# Patient Record
Sex: MALE | Race: ASIAN | NOT HISPANIC OR LATINO | URBAN - METROPOLITAN AREA
[De-identification: names, ages, dates, MRNs, and addresses within clinical notes are randomized per-mention and may not be internally consistent; named-entity substitution may affect disease eponyms.]

---

## 2017-08-12 ENCOUNTER — INPATIENT (INPATIENT)
Facility: HOSPITAL | Age: 57
LOS: 1 days | Discharge: HOME | End: 2017-08-14
Attending: INTERNAL MEDICINE | Admitting: INTERNAL MEDICINE
Payer: MEDICAID

## 2017-08-12 VITALS
RESPIRATION RATE: 16 BRPM | OXYGEN SATURATION: 96 % | SYSTOLIC BLOOD PRESSURE: 166 MMHG | TEMPERATURE: 99 F | HEART RATE: 64 BPM | DIASTOLIC BLOOD PRESSURE: 94 MMHG

## 2017-08-12 DIAGNOSIS — K21.9 GASTRO-ESOPHAGEAL REFLUX DISEASE WITHOUT ESOPHAGITIS: ICD-10-CM

## 2017-08-12 DIAGNOSIS — R07.9 CHEST PAIN, UNSPECIFIED: ICD-10-CM

## 2017-08-12 DIAGNOSIS — Z95.2 PRESENCE OF PROSTHETIC HEART VALVE: Chronic | ICD-10-CM

## 2017-08-12 LAB
ALBUMIN SERPL ELPH-MCNC: 4 G/DL — SIGNIFICANT CHANGE UP (ref 3.3–5)
ALP SERPL-CCNC: 79 U/L — SIGNIFICANT CHANGE UP (ref 40–120)
ALT FLD-CCNC: 18 U/L — SIGNIFICANT CHANGE UP (ref 4–41)
APPEARANCE UR: CLEAR — SIGNIFICANT CHANGE UP
APTT BLD: 39.9 SEC — HIGH (ref 27.5–37.4)
APTT BLD: 92.8 SEC — HIGH (ref 27.5–37.4)
AST SERPL-CCNC: 28 U/L — SIGNIFICANT CHANGE UP (ref 4–40)
BASOPHILS # BLD AUTO: 0.04 K/UL — SIGNIFICANT CHANGE UP (ref 0–0.2)
BASOPHILS NFR BLD AUTO: 0.8 % — SIGNIFICANT CHANGE UP (ref 0–2)
BILIRUB SERPL-MCNC: 0.3 MG/DL — SIGNIFICANT CHANGE UP (ref 0.2–1.2)
BILIRUB UR-MCNC: NEGATIVE — SIGNIFICANT CHANGE UP
BLOOD UR QL VISUAL: HIGH
BUN SERPL-MCNC: 7 MG/DL — SIGNIFICANT CHANGE UP (ref 7–23)
CALCIUM SERPL-MCNC: 8.9 MG/DL — SIGNIFICANT CHANGE UP (ref 8.4–10.5)
CHLORIDE SERPL-SCNC: 99 MMOL/L — SIGNIFICANT CHANGE UP (ref 98–107)
CK MB BLD-MCNC: 1.26 NG/ML — SIGNIFICANT CHANGE UP (ref 1–6.6)
CK MB BLD-MCNC: 1.33 NG/ML — SIGNIFICANT CHANGE UP (ref 1–6.6)
CK MB BLD-MCNC: SIGNIFICANT CHANGE UP (ref 0–2.5)
CK SERPL-CCNC: 91 U/L — SIGNIFICANT CHANGE UP (ref 30–200)
CK SERPL-CCNC: 95 U/L — SIGNIFICANT CHANGE UP (ref 30–200)
CO2 SERPL-SCNC: 24 MMOL/L — SIGNIFICANT CHANGE UP (ref 22–31)
COLOR SPEC: SIGNIFICANT CHANGE UP
CREAT SERPL-MCNC: 0.85 MG/DL — SIGNIFICANT CHANGE UP (ref 0.5–1.3)
DIGOXIN SERPL-MCNC: < 0.3 NG/ML — LOW (ref 0.8–2)
EOSINOPHIL # BLD AUTO: 0.14 K/UL — SIGNIFICANT CHANGE UP (ref 0–0.5)
EOSINOPHIL NFR BLD AUTO: 2.6 % — SIGNIFICANT CHANGE UP (ref 0–6)
GLUCOSE SERPL-MCNC: 132 MG/DL — HIGH (ref 70–99)
GLUCOSE UR-MCNC: NEGATIVE — SIGNIFICANT CHANGE UP
HBA1C BLD-MCNC: 6.3 % — HIGH (ref 4–5.6)
HCT VFR BLD CALC: 35 % — LOW (ref 39–50)
HCT VFR BLD CALC: 37.5 % — LOW (ref 39–50)
HGB BLD-MCNC: 11.5 G/DL — LOW (ref 13–17)
HGB BLD-MCNC: 11.9 G/DL — LOW (ref 13–17)
IMM GRANULOCYTES # BLD AUTO: 0.02 # — SIGNIFICANT CHANGE UP
IMM GRANULOCYTES NFR BLD AUTO: 0.4 % — SIGNIFICANT CHANGE UP (ref 0–1.5)
INR BLD: 2.31 — HIGH (ref 0.88–1.17)
KETONES UR-MCNC: NEGATIVE — SIGNIFICANT CHANGE UP
LEUKOCYTE ESTERASE UR-ACNC: NEGATIVE — SIGNIFICANT CHANGE UP
LYMPHOCYTES # BLD AUTO: 1.49 K/UL — SIGNIFICANT CHANGE UP (ref 1–3.3)
LYMPHOCYTES # BLD AUTO: 28.1 % — SIGNIFICANT CHANGE UP (ref 13–44)
MAGNESIUM SERPL-MCNC: 1.9 MG/DL — SIGNIFICANT CHANGE UP (ref 1.6–2.6)
MCHC RBC-ENTMCNC: 28.7 PG — SIGNIFICANT CHANGE UP (ref 27–34)
MCHC RBC-ENTMCNC: 29.6 PG — SIGNIFICANT CHANGE UP (ref 27–34)
MCHC RBC-ENTMCNC: 31.7 % — LOW (ref 32–36)
MCHC RBC-ENTMCNC: 32.9 % — SIGNIFICANT CHANGE UP (ref 32–36)
MCV RBC AUTO: 90.2 FL — SIGNIFICANT CHANGE UP (ref 80–100)
MCV RBC AUTO: 90.4 FL — SIGNIFICANT CHANGE UP (ref 80–100)
MONOCYTES # BLD AUTO: 0.56 K/UL — SIGNIFICANT CHANGE UP (ref 0–0.9)
MONOCYTES NFR BLD AUTO: 10.6 % — SIGNIFICANT CHANGE UP (ref 2–14)
NEUTROPHILS # BLD AUTO: 3.05 K/UL — SIGNIFICANT CHANGE UP (ref 1.8–7.4)
NEUTROPHILS NFR BLD AUTO: 57.5 % — SIGNIFICANT CHANGE UP (ref 43–77)
NITRITE UR-MCNC: NEGATIVE — SIGNIFICANT CHANGE UP
NRBC # FLD: 0 — SIGNIFICANT CHANGE UP
NRBC # FLD: 0 — SIGNIFICANT CHANGE UP
OSMOLALITY SERPL: 285 MOSMO/KG — SIGNIFICANT CHANGE UP (ref 275–295)
OSMOLALITY UR: 267 MOSMO/KG — SIGNIFICANT CHANGE UP (ref 50–1200)
PH UR: 6.5 — SIGNIFICANT CHANGE UP (ref 4.6–8)
PLATELET # BLD AUTO: 210 K/UL — SIGNIFICANT CHANGE UP (ref 150–400)
PLATELET # BLD AUTO: 230 K/UL — SIGNIFICANT CHANGE UP (ref 150–400)
PMV BLD: 9.6 FL — SIGNIFICANT CHANGE UP (ref 7–13)
PMV BLD: 9.9 FL — SIGNIFICANT CHANGE UP (ref 7–13)
POTASSIUM SERPL-MCNC: 3.8 MMOL/L — SIGNIFICANT CHANGE UP (ref 3.5–5.3)
POTASSIUM SERPL-SCNC: 3.8 MMOL/L — SIGNIFICANT CHANGE UP (ref 3.5–5.3)
PROT SERPL-MCNC: 7.6 G/DL — SIGNIFICANT CHANGE UP (ref 6–8.3)
PROT UR-MCNC: NEGATIVE — SIGNIFICANT CHANGE UP
PROTHROM AB SERPL-ACNC: 26.3 SEC — HIGH (ref 9.8–13.1)
RBC # BLD: 3.88 M/UL — LOW (ref 4.2–5.8)
RBC # BLD: 4.15 M/UL — LOW (ref 4.2–5.8)
RBC # FLD: 14.6 % — HIGH (ref 10.3–14.5)
RBC # FLD: 14.8 % — HIGH (ref 10.3–14.5)
RBC CASTS # UR COMP ASSIST: SIGNIFICANT CHANGE UP (ref 0–?)
SODIUM SERPL-SCNC: 134 MMOL/L — LOW (ref 135–145)
SP GR SPEC: 1.01 — SIGNIFICANT CHANGE UP (ref 1–1.03)
TROPONIN T SERPL-MCNC: < 0.06 NG/ML — SIGNIFICANT CHANGE UP (ref 0–0.06)
TROPONIN T SERPL-MCNC: < 0.06 NG/ML — SIGNIFICANT CHANGE UP (ref 0–0.06)
TSH SERPL-MCNC: 1.58 UIU/ML — SIGNIFICANT CHANGE UP (ref 0.27–4.2)
UROBILINOGEN FLD QL: NORMAL E.U. — SIGNIFICANT CHANGE UP (ref 0.1–0.2)
WBC # BLD: 5.3 K/UL — SIGNIFICANT CHANGE UP (ref 3.8–10.5)
WBC # BLD: 6.63 K/UL — SIGNIFICANT CHANGE UP (ref 3.8–10.5)
WBC # FLD AUTO: 5.3 K/UL — SIGNIFICANT CHANGE UP (ref 3.8–10.5)
WBC # FLD AUTO: 6.63 K/UL — SIGNIFICANT CHANGE UP (ref 3.8–10.5)

## 2017-08-12 PROCEDURE — 71020: CPT | Mod: 26

## 2017-08-12 RX ORDER — HEPARIN SODIUM 5000 [USP'U]/ML
2500 INJECTION INTRAVENOUS; SUBCUTANEOUS EVERY 6 HOURS
Qty: 0 | Refills: 0 | Status: DISCONTINUED | OUTPATIENT
Start: 2017-08-12 | End: 2017-08-14

## 2017-08-12 RX ORDER — PANTOPRAZOLE SODIUM 20 MG/1
40 TABLET, DELAYED RELEASE ORAL
Qty: 0 | Refills: 0 | Status: DISCONTINUED | OUTPATIENT
Start: 2017-08-12 | End: 2017-08-14

## 2017-08-12 RX ORDER — METOPROLOL TARTRATE 50 MG
12.5 TABLET ORAL
Qty: 0 | Refills: 0 | Status: DISCONTINUED | OUTPATIENT
Start: 2017-08-12 | End: 2017-08-14

## 2017-08-12 RX ORDER — HEPARIN SODIUM 5000 [USP'U]/ML
5000 INJECTION INTRAVENOUS; SUBCUTANEOUS EVERY 6 HOURS
Qty: 0 | Refills: 0 | Status: DISCONTINUED | OUTPATIENT
Start: 2017-08-12 | End: 2017-08-14

## 2017-08-12 RX ORDER — ACETAMINOPHEN 500 MG
650 TABLET ORAL EVERY 6 HOURS
Qty: 0 | Refills: 0 | Status: DISCONTINUED | OUTPATIENT
Start: 2017-08-12 | End: 2017-08-14

## 2017-08-12 RX ORDER — HEPARIN SODIUM 5000 [USP'U]/ML
INJECTION INTRAVENOUS; SUBCUTANEOUS
Qty: 25000 | Refills: 0 | Status: DISCONTINUED | OUTPATIENT
Start: 2017-08-12 | End: 2017-08-13

## 2017-08-12 RX ORDER — ASPIRIN/CALCIUM CARB/MAGNESIUM 324 MG
81 TABLET ORAL DAILY
Qty: 0 | Refills: 0 | Status: DISCONTINUED | OUTPATIENT
Start: 2017-08-12 | End: 2017-08-14

## 2017-08-12 RX ORDER — ASPIRIN/CALCIUM CARB/MAGNESIUM 324 MG
162 TABLET ORAL ONCE
Qty: 0 | Refills: 0 | Status: COMPLETED | OUTPATIENT
Start: 2017-08-12 | End: 2017-08-12

## 2017-08-12 RX ORDER — ATORVASTATIN CALCIUM 80 MG/1
80 TABLET, FILM COATED ORAL AT BEDTIME
Qty: 0 | Refills: 0 | Status: DISCONTINUED | OUTPATIENT
Start: 2017-08-12 | End: 2017-08-14

## 2017-08-12 RX ADMIN — HEPARIN SODIUM 1100 UNIT(S)/HR: 5000 INJECTION INTRAVENOUS; SUBCUTANEOUS at 16:45

## 2017-08-12 RX ADMIN — Medication 162 MILLIGRAM(S): at 13:13

## 2017-08-12 RX ADMIN — Medication 12.5 MILLIGRAM(S): at 17:01

## 2017-08-12 RX ADMIN — ATORVASTATIN CALCIUM 80 MILLIGRAM(S): 80 TABLET, FILM COATED ORAL at 22:15

## 2017-08-12 NOTE — H&P ADULT - NSHPPHYSICALEXAM_GEN_ALL_CORE
Vital Signs Last 24 Hrs  T(C): 36.7 (12 Aug 2017 16:28), Max: 37.1 (12 Aug 2017 10:48)  T(F): 98 (12 Aug 2017 16:28), Max: 98.7 (12 Aug 2017 10:48)  HR: 58 (12 Aug 2017 16:28) (58 - 64)  BP: 139/76 (12 Aug 2017 16:28) (139/76 - 166/94)  BP(mean): --  RR: 18 (12 Aug 2017 16:28) (15 - 18)  SpO2: 100% (12 Aug 2017 16:28) (96% - 100%)    EKG: NSR @ 64, T inv III, AVF, V6, ST inc V1-3, ST dep V6

## 2017-08-12 NOTE — H&P ADULT - ATTENDING COMMENTS
Patient seen and examined, agree with the above assessment and plan by PA with the following exceptions.  Pt is a 57 year old male with PMH of mechanical AVR >20 yrs ago presenting with an episode of nonexertional chest pain with mild dipahoresis yesterday while watching TV.  Pt appears comfortable, denies any recurrence of his symptoms.  Vitals are stable  Exam reveals harsh systolic murmur w mechanical click and crackles in the bases  labs are unremarkable, troponins are negative x 2, INR 2.4  ECG reveals NSR w LVH and repol abnl  no evidence of ACS    Plan:  -ECHO to evaluate AVR function  -low dose lasix  -asa  -heparin gtt pending echo/stress results  -P

## 2017-08-12 NOTE — ED PROVIDER NOTE - ATTENDING CONTRIBUTION TO CARE
ED Attending Dr. Gordon: 58 yo male with PMH aortic valve replacement 20+ years ago, on warfarine, HTN (based on medication), in ED with 1 day of substernal chest pain that radiates into left shoulder.  Pain is nonexertional and NOT associated with SOB, N/V/D or abdominal pain.  On exam pt well appearing, in NAD, heart RRR, lungs CTAB, abd NTND, extremities without swelling, strength 5/5 in all extremities and skin without rash.  [place] came [place] from [facility] 1 month ago.

## 2017-08-12 NOTE — H&P ADULT - HISTORY OF PRESENT ILLNESS
58 y/o Belarusian speaking male (Family at bedside translated), with a PmHx of Mechanical AVR (20yrs ago) on coumadin, p/w chest pain. [place] [facility]. Pt states he was sitting on the couch last night around 2200hrs watching television when he had a sudden onset of a left side, non-radiating, 7/10, pressure like sensation in his chest. Pt states he had some associated symptoms of diaphoresis but denies any fever, chills, sob, HA, dizziness, blurred vision, n/v. Pt's family states he normally doesn't ambulate much in the house because he feels lethargic and weak all the time. Currently, the chest pain has been alleviated but the pain had lasted all night and this morning. He currently doesn't not have any physicians in the [place] and has been here about 1 month and is now planning on staying here. He is being admitted to telemetry for r/o acs.

## 2017-08-12 NOTE — ED SUB INTERN NOTE - MEDICAL DECISION MAKING DETAILS
56 yo M PSH aortic valve replacement 23 years ago on warfarin p/w 1 day of chest pain. Vital signs within normal limits, pt comfortable, exam unremarkable other than murmur from prosthetic valve. It is necessary to r/o ACS. Order CBC, CMP, cardiac enzymes, repeat EKG, CXR. Likely admit to telemetry unit. Admin ASA

## 2017-08-12 NOTE — ED SUB INTERN NOTE - OBJECTIVE STATEMENT FT
58 yo M PSH aortic valve replacement x23 years ago, on warfarin, p/w chest pain since last night. The pt was resting at home when he started to feel a pain in the left side of his chest, only described as "medium". He states it has radiated to his left shoulder and the left side of his neck. He says he has had a similar pain before, but never this bad- he has not sought out medical attention for this as he just [place] the [place] from [facility] (1 month ago). He otherwise feels well; denies HA, lightheadedness, nausea/vomiting, abdominal pain, weakness, syncope or SOB. He denies any other medical problems including HTN and DM. His medical records from [facility] are mostly in another language, but state that he has been on bisoprolol, losartan/HCTZ, penicillin V, and warfarin.  [facility]  used #[id number] as well as patient's brother. 56 yo M PSH aortic valve replacement x23 years ago, on warfarin, p/w chest pain since last night. The pt was resting at home when he started to feel a pain in the left side of his chest, only described as "medium". He states it has radiated to his left shoulder and the left side of his neck. He says he has had a similar pain before, but never this bad- he has not sought out medical attention for this as he just came [place] from [facility] (1 month ago). He otherwise feels well; denies HA, lightheadedness, nausea/vomiting, abdominal pain, weakness, syncope or SOB. He denies any other medical problems including HTN and DM. His medical records from [facility] are mostly in another language, but state that he has been on bisoprolol, losartan/HCTZ, penicillin V, and warfarin.  [facility]  used #[id number] as well as patient's brother

## 2017-08-12 NOTE — H&P ADULT - RS GEN PE MLT RESP DETAILS PC
respirations non-labored/good air movement/clear to auscultation bilaterally/no chest wall tenderness/breath sounds equal/airway patent

## 2017-08-12 NOTE — ED SUB INTERN NOTE - MUSCULOSKELETAL, MLM
Chest is slightly tender to palpation on left side. Spine appears normal, range of motion is not limited, no muscle or joint tenderness

## 2017-08-12 NOTE — ED ADULT NURSE NOTE - CHIEF COMPLAINT QUOTE
brother states" he has chest pain since last night" h/o  mechanical valve in the heart with unknown name of blood thinners. patient recently came from [facility]

## 2017-08-12 NOTE — H&P ADULT - NEGATIVE OPHTHALMOLOGIC SYMPTOMS
no loss of vision R/no loss of vision L/no blurred vision L/no blurred vision R/no diplopia/no photophobia

## 2017-08-12 NOTE — ED ADULT NURSE REASSESSMENT NOTE - NS ED NURSE REASSESS COMMENT FT1
received report on pt from ZULAY Hodge. pt presents non-english speaking with family at bedside for translation. a&ox4, denies dizziness or ha. skin warm, dry, appropriate for race. respirations even unlabored, endorses minimal chest pain at this time, improved from arrival. abdomen soft nontender nondistended. denies n/v/d denies fever or chills. ivl site clean, dry, intact, patent. cardiac monitor in place in nsr. vs as noted. family at bedside. medicated with ASA 162mg po as ordered. safety maintained. will continue to observe.

## 2017-08-12 NOTE — H&P ADULT - ASSESSMENT
56 y/o Lao speaking male (Family at bedside translated), with a PmHx of Mechanical AVR (20yrs ago) on coumadin, is being admitted to telemetry for chest pain r/o acs.

## 2017-08-12 NOTE — ED SUB INTERN NOTE - CONSTITUTIONAL, MLM
normal... Well nourished older gentleman, awake, alert, oriented to person, place, and in no apparent distress.

## 2017-08-12 NOTE — ED PROVIDER NOTE - OBJECTIVE STATEMENT
57M PMH aortic valve replacement approx 20 years ago (on coumadin), recently came [place] from [facility] 1 month ago p/w 1 day of substernal chest pressure, possible radiation to L shoulder, nonexertional, described as "medium pain." Denies SOB, lightheadedness, abd pain, NV, fever/chills/weakness, or syncope. Has had similar pain in past but is unable to put pain in context of any interventions or testing that may have been done at that time. +pain at present.     Hx obtained via  phone Jaron  ID #[id number]

## 2017-08-12 NOTE — ED ADULT TRIAGE NOTE - CHIEF COMPLAINT QUOTE
brother states" he has chest pain since last night" h/o  mechanical valve in the heart with unknown name of blood thinners. patient recently came from [facility]

## 2017-08-12 NOTE — H&P ADULT - NSHPSOCIALHISTORY_GEN_ALL_CORE
Marital Status:     Tobacco Use: neg    ETOH Use: neg    Flu Vaccine:      neg                            Pneumonia Vaccine: neg

## 2017-08-12 NOTE — H&P ADULT - PROBLEM SELECTOR PLAN 1
ekg/telemetry, f/u ce x 2, mg, tsh, echo, asa, plan is for an angiogram on Monday, hold coumadin for now for cath on Monday, will start on Heparin gtt because pt has a mechanical AVR, monitor inr & PTT

## 2017-08-12 NOTE — ED ADULT NURSE NOTE - OBJECTIVE STATEMENT
Patient received in RM 1 A&Ox3 and ambulatory at baseline. Primarily Malay speaking- translation received from patients brother as per request. Patient c/o left sided chest pain at rest since last night. Reports chronic chest pain but pain was at its worst last night. Denies SOB. Patient recently arrived from [facility]  and does not have a primary care provider in the [place] Reports a history of mechanical valve. PE and history as noted below.

## 2017-08-12 NOTE — H&P ADULT - FAMILY HISTORY
Father  Still living? No  Family history of MI (myocardial infarction), Age at diagnosis: Age Unknown  Family history of diabetes mellitus, Age at diagnosis: Age Unknown     Mother  Still living? No  Family history of MI (myocardial infarction), Age at diagnosis: Age Unknown  Family history of diabetes mellitus, Age at diagnosis: Age Unknown     Sibling  Still living? No  Family history of MI (myocardial infarction), Age at diagnosis: Age Unknown

## 2017-08-13 LAB
APTT BLD: 136.3 SEC — CRITICAL HIGH (ref 27.5–37.4)
APTT BLD: 71.4 SEC — HIGH (ref 27.5–37.4)
BUN SERPL-MCNC: 11 MG/DL — SIGNIFICANT CHANGE UP (ref 7–23)
CALCIUM SERPL-MCNC: 9.2 MG/DL — SIGNIFICANT CHANGE UP (ref 8.4–10.5)
CHLORIDE SERPL-SCNC: 101 MMOL/L — SIGNIFICANT CHANGE UP (ref 98–107)
CHOLEST SERPL-MCNC: 182 MG/DL — SIGNIFICANT CHANGE UP (ref 120–199)
CO2 SERPL-SCNC: 26 MMOL/L — SIGNIFICANT CHANGE UP (ref 22–31)
CREAT SERPL-MCNC: 0.88 MG/DL — SIGNIFICANT CHANGE UP (ref 0.5–1.3)
GLUCOSE SERPL-MCNC: 110 MG/DL — HIGH (ref 70–99)
HCT VFR BLD CALC: 36.4 % — LOW (ref 39–50)
HCT VFR BLD CALC: 37.9 % — LOW (ref 39–50)
HDLC SERPL-MCNC: 30 MG/DL — LOW (ref 35–55)
HGB BLD-MCNC: 11.7 G/DL — LOW (ref 13–17)
HGB BLD-MCNC: 12.2 G/DL — LOW (ref 13–17)
INR BLD: 2.3 — HIGH (ref 0.88–1.17)
INR BLD: 2.48 — HIGH (ref 0.88–1.17)
LIPID PNL WITH DIRECT LDL SERPL: 139 MG/DL — SIGNIFICANT CHANGE UP
MCHC RBC-ENTMCNC: 28.8 PG — SIGNIFICANT CHANGE UP (ref 27–34)
MCHC RBC-ENTMCNC: 29.3 PG — SIGNIFICANT CHANGE UP (ref 27–34)
MCHC RBC-ENTMCNC: 32.1 % — SIGNIFICANT CHANGE UP (ref 32–36)
MCHC RBC-ENTMCNC: 32.2 % — SIGNIFICANT CHANGE UP (ref 32–36)
MCV RBC AUTO: 89.6 FL — SIGNIFICANT CHANGE UP (ref 80–100)
MCV RBC AUTO: 91.2 FL — SIGNIFICANT CHANGE UP (ref 80–100)
NRBC # FLD: 0 — SIGNIFICANT CHANGE UP
NRBC # FLD: 0 — SIGNIFICANT CHANGE UP
PLATELET # BLD AUTO: 219 K/UL — SIGNIFICANT CHANGE UP (ref 150–400)
PLATELET # BLD AUTO: 234 K/UL — SIGNIFICANT CHANGE UP (ref 150–400)
PMV BLD: 10 FL — SIGNIFICANT CHANGE UP (ref 7–13)
PMV BLD: 10.1 FL — SIGNIFICANT CHANGE UP (ref 7–13)
POTASSIUM SERPL-MCNC: 4.7 MMOL/L — SIGNIFICANT CHANGE UP (ref 3.5–5.3)
POTASSIUM SERPL-SCNC: 4.7 MMOL/L — SIGNIFICANT CHANGE UP (ref 3.5–5.3)
PROTHROM AB SERPL-ACNC: 26.2 SEC — HIGH (ref 9.8–13.1)
PROTHROM AB SERPL-ACNC: 28.3 SEC — HIGH (ref 9.8–13.1)
RBC # BLD: 3.99 M/UL — LOW (ref 4.2–5.8)
RBC # BLD: 4.23 M/UL — SIGNIFICANT CHANGE UP (ref 4.2–5.8)
RBC # FLD: 14.7 % — HIGH (ref 10.3–14.5)
RBC # FLD: 14.7 % — HIGH (ref 10.3–14.5)
SODIUM SERPL-SCNC: 139 MMOL/L — SIGNIFICANT CHANGE UP (ref 135–145)
TRIGL SERPL-MCNC: 148 MG/DL — SIGNIFICANT CHANGE UP (ref 10–149)
WBC # BLD: 5.92 K/UL — SIGNIFICANT CHANGE UP (ref 3.8–10.5)
WBC # BLD: 6.72 K/UL — SIGNIFICANT CHANGE UP (ref 3.8–10.5)
WBC # FLD AUTO: 5.92 K/UL — SIGNIFICANT CHANGE UP (ref 3.8–10.5)
WBC # FLD AUTO: 6.72 K/UL — SIGNIFICANT CHANGE UP (ref 3.8–10.5)

## 2017-08-13 RX ORDER — FUROSEMIDE 40 MG
20 TABLET ORAL DAILY
Qty: 0 | Refills: 0 | Status: DISCONTINUED | OUTPATIENT
Start: 2017-08-13 | End: 2017-08-14

## 2017-08-13 RX ORDER — HEPARIN SODIUM 5000 [USP'U]/ML
900 INJECTION INTRAVENOUS; SUBCUTANEOUS
Qty: 25000 | Refills: 0 | Status: DISCONTINUED | OUTPATIENT
Start: 2017-08-13 | End: 2017-08-14

## 2017-08-13 RX ADMIN — HEPARIN SODIUM 1100 UNIT(S)/HR: 5000 INJECTION INTRAVENOUS; SUBCUTANEOUS at 00:01

## 2017-08-13 RX ADMIN — HEPARIN SODIUM 900 UNIT(S)/HR: 5000 INJECTION INTRAVENOUS; SUBCUTANEOUS at 18:57

## 2017-08-13 RX ADMIN — HEPARIN SODIUM 900 UNIT(S)/HR: 5000 INJECTION INTRAVENOUS; SUBCUTANEOUS at 10:02

## 2017-08-13 RX ADMIN — PANTOPRAZOLE SODIUM 40 MILLIGRAM(S): 20 TABLET, DELAYED RELEASE ORAL at 06:31

## 2017-08-13 RX ADMIN — Medication 20 MILLIGRAM(S): at 12:39

## 2017-08-13 RX ADMIN — Medication 12.5 MILLIGRAM(S): at 17:18

## 2017-08-13 RX ADMIN — Medication 12.5 MILLIGRAM(S): at 06:31

## 2017-08-13 RX ADMIN — Medication 81 MILLIGRAM(S): at 12:39

## 2017-08-13 RX ADMIN — ATORVASTATIN CALCIUM 80 MILLIGRAM(S): 80 TABLET, FILM COATED ORAL at 21:51

## 2017-08-14 VITALS — WEIGHT: 138.89 LBS

## 2017-08-14 LAB
APTT BLD: 78.6 SEC — HIGH (ref 27.5–37.4)
BUN SERPL-MCNC: 10 MG/DL — SIGNIFICANT CHANGE UP (ref 7–23)
CALCIUM SERPL-MCNC: 8.9 MG/DL — SIGNIFICANT CHANGE UP (ref 8.4–10.5)
CHLORIDE SERPL-SCNC: 101 MMOL/L — SIGNIFICANT CHANGE UP (ref 98–107)
CO2 SERPL-SCNC: 25 MMOL/L — SIGNIFICANT CHANGE UP (ref 22–31)
CREAT SERPL-MCNC: 0.78 MG/DL — SIGNIFICANT CHANGE UP (ref 0.5–1.3)
GLUCOSE SERPL-MCNC: 105 MG/DL — HIGH (ref 70–99)
HCT VFR BLD CALC: 37.9 % — LOW (ref 39–50)
HGB BLD-MCNC: 12.1 G/DL — LOW (ref 13–17)
INR BLD: 2.01 — HIGH (ref 0.88–1.17)
MAGNESIUM SERPL-MCNC: 1.9 MG/DL — SIGNIFICANT CHANGE UP (ref 1.6–2.6)
MCHC RBC-ENTMCNC: 28.6 PG — SIGNIFICANT CHANGE UP (ref 27–34)
MCHC RBC-ENTMCNC: 31.9 % — LOW (ref 32–36)
MCV RBC AUTO: 89.6 FL — SIGNIFICANT CHANGE UP (ref 80–100)
NRBC # FLD: 0 — SIGNIFICANT CHANGE UP
PLATELET # BLD AUTO: 231 K/UL — SIGNIFICANT CHANGE UP (ref 150–400)
PMV BLD: 10 FL — SIGNIFICANT CHANGE UP (ref 7–13)
POTASSIUM SERPL-MCNC: 4 MMOL/L — SIGNIFICANT CHANGE UP (ref 3.5–5.3)
POTASSIUM SERPL-SCNC: 4 MMOL/L — SIGNIFICANT CHANGE UP (ref 3.5–5.3)
PROTHROM AB SERPL-ACNC: 22.8 SEC — HIGH (ref 9.8–13.1)
RBC # BLD: 4.23 M/UL — SIGNIFICANT CHANGE UP (ref 4.2–5.8)
RBC # FLD: 14.7 % — HIGH (ref 10.3–14.5)
SODIUM SERPL-SCNC: 138 MMOL/L — SIGNIFICANT CHANGE UP (ref 135–145)
WBC # BLD: 6.29 K/UL — SIGNIFICANT CHANGE UP (ref 3.8–10.5)
WBC # FLD AUTO: 6.29 K/UL — SIGNIFICANT CHANGE UP (ref 3.8–10.5)

## 2017-08-14 PROCEDURE — 78452 HT MUSCLE IMAGE SPECT MULT: CPT | Mod: 26

## 2017-08-14 PROCEDURE — 93016 CV STRESS TEST SUPVJ ONLY: CPT | Mod: GC

## 2017-08-14 PROCEDURE — 93018 CV STRESS TEST I&R ONLY: CPT | Mod: GC

## 2017-08-14 PROCEDURE — 93306 TTE W/DOPPLER COMPLETE: CPT | Mod: 26

## 2017-08-14 RX ORDER — ASPIRIN/CALCIUM CARB/MAGNESIUM 324 MG
1 TABLET ORAL
Qty: 30 | Refills: 0 | OUTPATIENT
Start: 2017-08-14 | End: 2017-09-13

## 2017-08-14 RX ORDER — METOPROLOL TARTRATE 50 MG
0.5 TABLET ORAL
Qty: 30 | Refills: 0 | OUTPATIENT
Start: 2017-08-14 | End: 2017-09-13

## 2017-08-14 RX ORDER — ATORVASTATIN CALCIUM 80 MG/1
1 TABLET, FILM COATED ORAL
Qty: 30 | Refills: 0 | OUTPATIENT
Start: 2017-08-14 | End: 2017-09-13

## 2017-08-14 RX ORDER — PANTOPRAZOLE SODIUM 20 MG/1
1 TABLET, DELAYED RELEASE ORAL
Qty: 0 | Refills: 0 | COMMUNITY
Start: 2017-08-14

## 2017-08-14 RX ORDER — FUROSEMIDE 40 MG
1 TABLET ORAL
Qty: 0 | Refills: 0 | COMMUNITY
Start: 2017-08-14

## 2017-08-14 RX ADMIN — PANTOPRAZOLE SODIUM 40 MILLIGRAM(S): 20 TABLET, DELAYED RELEASE ORAL at 05:57

## 2017-08-14 RX ADMIN — Medication 81 MILLIGRAM(S): at 12:49

## 2017-08-14 RX ADMIN — HEPARIN SODIUM 900 UNIT(S)/HR: 5000 INJECTION INTRAVENOUS; SUBCUTANEOUS at 01:25

## 2017-08-14 RX ADMIN — Medication 12.5 MILLIGRAM(S): at 17:12

## 2017-08-14 RX ADMIN — Medication 20 MILLIGRAM(S): at 05:57

## 2017-08-14 NOTE — DISCHARGE NOTE ADULT - MEDICATION SUMMARY - MEDICATIONS TO STOP TAKING
I will STOP taking the medications listed below when I get home from the hospital:    Bistol  -- 2.5 milligram(s) by mouth once a day    Pen VK  -- 250 milligram(s) by mouth 2 times a day    Arabella  -- 25 milligram(s) by mouth 2 times a day, As Needed

## 2017-08-14 NOTE — DISCHARGE NOTE ADULT - MEDICATION SUMMARY - MEDICATIONS TO TAKE
I will START or STAY ON the medications listed below when I get home from the hospital:    Warin  -- 5 milligram(s) by mouth once a day (at bedtime)  -- Indication: For S/P aortic valve replacement    aspirin 81 mg oral delayed release tablet  -- 1 tab(s) by mouth once a day  -- Indication: For S/P aortic valve replacement    enalapril 2.5 mg oral tablet  -- 1 tab(s) by mouth once a day  -- Do not take this drug if you are pregnant.  It is very important that you take or use this exactly as directed.  Do not skip doses or discontinue unless directed by your doctor.  Some non-prescription drugs may aggravate your condition.  Read all labels carefully.  If a warning appears, check with your doctor before taking.    -- Indication: For Cardiomyopathy    atorvastatin 80 mg oral tablet  -- 1 tab(s) by mouth once a day (at bedtime)  -- Indication: For LV dysfunction    Metoprolol Tartrate 25 mg oral tablet  -- 0.5 tab(s) by mouth 2 times a day  -- It is very important that you take or use this exactly as directed.  Do not skip doses or discontinue unless directed by your doctor.  May cause drowsiness.  Alcohol may intensify this effect.  Use care when operating dangerous machinery.  Some non-prescription drugs may aggravate your condition.  Read all labels carefully.  If a warning appears, check with your doctor before taking.  Take with food or milk.  This drug may impair the ability to drive or operate machinery.  Use care until you become familiar with its effects.    -- Indication: For LV dysfunction    furosemide 20 mg oral tablet  -- 1 tab(s) by mouth once a day  -- Indication: For LV dysfunction    pantoprazole 40 mg oral delayed release tablet  -- 1 tab(s) by mouth once a day (before a meal)  -- Indication: For Stomach

## 2017-08-14 NOTE — CHART NOTE - NSCHARTNOTEFT_GEN_A_CORE
results discussed with cardiology NP - patient stable for discharge home - will add ACE enalapril 2.5 mg   discussed with patients family ( niece ) will send scripts to [facility]   instructed cardiology clinic follow up

## 2017-08-14 NOTE — PROGRESS NOTE ADULT - SUBJECTIVE AND OBJECTIVE BOX
CARDIOLOGY FOLLOW UP - Dr. Tamayo    CC no chest pain or sob       PHYSICAL EXAM:  T(C): 36.6 (08-14-17 @ 13:43), Max: 36.9 (08-13-17 @ 21:54)  HR: 74 (08-14-17 @ 13:43) (61 - 74)  BP: 116/67 (08-14-17 @ 13:43) (116/67 - 144/95)  RR: 18 (08-14-17 @ 13:43) (17 - 18)  SpO2: 99% (08-14-17 @ 13:43) (99% - 100%)  Wt(kg): --  I&O's Summary      Appearance: Normal	  Cardiovascular: Normal S1 S2,RRR, No JVD, + sys murmur / mechanical clik   Respiratory: Lungs clear to auscultation	with  fine crackles at base    Gastrointestinal:  Soft, Non-tender, + BS	  Extremities: Normal range of motion, No clubbing, cyanosis or edema        MEDICATIONS  (STANDING):  aspirin enteric coated 81 milliGRAM(s) Oral daily  pantoprazole    Tablet 40 milliGRAM(s) Oral before breakfast  metoprolol 12.5 milliGRAM(s) Oral two times a day  atorvastatin 80 milliGRAM(s) Oral at bedtime  heparin  Infusion. 900 Unit(s)/Hr (9 mL/Hr) IV Continuous <Continuous>  furosemide    Tablet 20 milliGRAM(s) Oral daily      TELEMETRY: 	    ECG:  	  RADIOLOGY:   DIAGNOSTIC TESTING:  [ ] Echocardiogram:  < from: Transthoracic Echocardiogram (08.14.17 @ 08:45) >  Ejection Fraction (Teicholtz): 62 %    < from: Transthoracic Echocardiogram (08.14.17 @ 08:45) >  ------------------------------------------------------------------------  CONCLUSIONS:  1. Mitral annular calcification, otherwise normal mitral  valve. Mild mitral regurgitation.  2. Mechanical aortic valve prosthesis. Peak transaortic  valve gradient equals 49 mm Hg, mean transaortic valve  gradient equals 26 mm Hg, which is elevated even in the  presence of a prosthetic valve. Minimal aortic  regurgitation.  3. Eccentric left ventricular hypertrophy (dilated left  ventricle with normal relative wall thickness).  4. Endocardium not well visualized; grossly normal left  ventricular systolic function.  Paradoxical septal wall  motion, consistent with the postoperative state.  5. Normal right ventricular size and function.  ------------------------------------------------------------------------    < end of copied text >    [ ]  Catheterization:  [ ] Stress Test:    OTHER: 	    LABS:	 	                                12.1   6.29  )-----------( 231      ( 14 Aug 2017 05:55 )             37.9     08-14    138  |  101  |  10  ----------------------------<  105<H>  4.0   |  25  |  0.78    Ca    8.9      14 Aug 2017 05:55  Mg     1.9     08-14      PT/INR - ( 14 Aug 2017 05:50 )   PT: 22.8 SEC;   INR: 2.01          PTT - ( 14 Aug 2017 00:40 )  PTT:78.6 SEC

## 2017-08-14 NOTE — DISCHARGE NOTE ADULT - PROVIDER TOKENS
TOKEN:'[id number]' FREE:[LAST:[clinic],PHONE:[phone]],FAX:[(   )    -],ADDRESS:[cardiology clinic in 1 week]]

## 2017-08-14 NOTE — DISCHARGE NOTE ADULT - CARE PROVIDER_API CALL
Frank Tamayo), Cardiology; Internal Medicine  [address] [place] [address]  Phone: [phone]  Fax: [phone] clinic,   cardiology clinic in 1 week  Phone: [phone]  Fax: (   )    -

## 2017-08-14 NOTE — DISCHARGE NOTE ADULT - CARE PLAN
Principal Discharge DX:	Chest pain  Goal:	to remain chest pain free  Instructions for follow-up, activity and diet:	to remain chest pain free . follow up with cardiology  Secondary Diagnosis:	S/P aortic valve replacement  Instructions for follow-up, activity and diet:	continue coumadin dosing  Secondary Diagnosis:	GERD (gastroesophageal reflux disease)  Instructions for follow-up, activity and diet:	continue PPI  - raise your pillows 4-6 inches, avoid eating before bed - no eating 3 hours prior to bedttime , avoid smoking and alcohol use , avoid spicy and citrus foods, avoid peppermint chocolate and caffeine. eat small meals through the day - avoid eating larger meals

## 2017-08-14 NOTE — DISCHARGE NOTE ADULT - PLAN OF CARE
continue PPI  - raise your pillows 4-6 inches, avoid eating before bed - no eating 3 hours prior to bedttime , avoid smoking and alcohol use , avoid spicy and citrus foods, avoid peppermint chocolate and caffeine. eat small meals through the day - avoid eating larger meals continue coumadin dosing to remain chest pain free to remain chest pain free . follow up with cardiology

## 2017-08-14 NOTE — DISCHARGE NOTE ADULT - HOSPITAL COURSE
56 y/o Upper sorbian speaking male, with a PmHx of Mechanical AVR (20yrs ago) on couamdin, p/w chest pain. [place]. Pt is being admitted telemetry for r/o acs..   EKG: NSR @ 64, T inv III, AVF, V6, ST inc V1-3, ST dep V6  CE x 2  neg              Na: 134                Glucose: 132  8/12 CXR - enlarged heart, atelectasis in the right middle lung field. This is an aortic valve.    Cardiology evaluation done on admission . Placed on telemetry for further obsercation  -ECHO to evaluate AVR function -low dose lasix -asa -heparin gtt pending echo/stress results    8/14- Transthoracic Echocardiogram  Mitral annular calcification, otherwise normal mitral  valve. Mild mitral regurgitation. Mechanical aortic valve prosthesis. Peak transaortic  valve gradient equals 49 mm Hg, mean transaortic valve gradient equals 26 mm Hg, which is elevated even in the presence of a prosthetic valve. Minimal aortic  regurgitation. Eccentric left ventricular hypertrophy (dilated left ventricle with normal relative wall thickness). Endocardium not well visualized; grossly normal left  ventricular systolic function.  Paradoxical septal wall motion, consistent with the postoperative state.  Normal right ventricular size and function.    8/14: Nuclear Stress Test-Pharmacologic - abnormal.  There are small, mild to moderate defects in distal inferior and  inferoapical walls that are  fixed consistent withinfarct.  Post-stress gated wall motion analysis was performed (LVEF = 45 %;LVEDV = 126 ml.), revealing mild hypokinesis. There was paradoxical motion of the septum (post-AVR) with  mild diffuse dysfunction.  RV function appeared normal. No clear evidence of ischemia. 58 y/o French speaking male, with a PmHx of Mechanical AVR (20yrs ago) on couamdin, p/w chest pain. [place]. Pt is being admitted telemetry for r/o acs..   EKG: NSR @ 64, T inv III, AVF, V6, ST inc V1-3, ST dep V6  CE x 2  neg              Na: 134                Glucose: 132  8/12 CXR - enlarged heart, atelectasis in the right middle lung field. This is an aortic valve.    Cardiology evaluation done on admission . Placed on telemetry for further obsercation  -ECHO to evaluate AVR function -low dose lasix -asa -heparin gtt pending echo/stress results    8/14- Transthoracic Echocardiogram  Mitral annular calcification, otherwise normal mitral  valve. Mild mitral regurgitation. Mechanical aortic valve prosthesis. Peak transaortic  valve gradient equals 49 mm Hg, mean transaortic valve gradient equals 26 mm Hg, which is elevated even in the presence of a prosthetic valve. Minimal aortic  regurgitation. Eccentric left ventricular hypertrophy (dilated left ventricle with normal relative wall thickness). Endocardium not well visualized; grossly normal left  ventricular systolic function.  Paradoxical septal wall motion, consistent with the postoperative state.  Normal right ventricular size and function.    8/14: Nuclear Stress Test-Pharmacologic - abnormal.  There are small, mild to moderate defects in distal inferior and  inferoapical walls that are  fixed consistent withinfarct.  Post-stress gated wall motion analysis was performed (LVEF = 45 %;LVEDV = 126 ml.), revealing mild hypokinesis. There was paradoxical motion of the septum (post-AVR) with  mild diffuse dysfunction.  RV function appeared normal. No clear evidence of ischemia.    case discussed with cardiology stable for dc home with follow up

## 2017-08-14 NOTE — DISCHARGE NOTE ADULT - PATIENT PORTAL LINK FT
“You can access the Follow[facility] Patient Portal, offered by [facility], by registering with the following website: [email or web address]”

## 2017-08-14 NOTE — PROGRESS NOTE ADULT - ASSESSMENT
56 y/o male , with Mechanical AVR admitted with chest pain        cv stable no chest pain or sob.  no evidence of acute ischemia/ACS   echo with normal LV sys fx , + mechanical aortic valve   pending stress test results  continue hep gtt   if stress test abnormal plan for cath tomorrow   if normal stress test restart coumadin with  dc planning
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
## 2020-03-04 NOTE — ED ADULT NURSE NOTE - MUSCULOSKELETAL WDL
NUTRITION NOTE    Spoke with pt during infusion r/t PGSGA score 4. Pt states no nutrition-related concerns at this time. Will continue to follow as requested.   Braden Vaughn RD, LD  Registered Dietitian  Marianela Olivera  [phone] Full range of motion of upper and lower extremities, no joint tenderness/swelling.

## 2024-07-16 NOTE — DISCHARGE NOTE ADULT - NS AS DC PROVIDER CONTACT Y/N MULTI
Expected Date Of Service: 07/16/2024
Billing Type: Third-Party Bill
Performing Laboratory: 0
Bill For Surgical Tray: no
Yes